# Patient Record
Sex: MALE | Race: WHITE | HISPANIC OR LATINO | URBAN - METROPOLITAN AREA
[De-identification: names, ages, dates, MRNs, and addresses within clinical notes are randomized per-mention and may not be internally consistent; named-entity substitution may affect disease eponyms.]

---

## 2021-11-09 ENCOUNTER — OFFICE (OUTPATIENT)
Dept: URBAN - METROPOLITAN AREA CLINIC 79 | Facility: CLINIC | Age: 66
End: 2021-11-09

## 2021-11-09 ENCOUNTER — OFFICE (OUTPATIENT)
Dept: URBAN - METROPOLITAN AREA TELEHEALTH 12 | Facility: TELEHEALTH | Age: 66
End: 2021-11-09

## 2021-11-09 VITALS — HEIGHT: 65 IN | WEIGHT: 150 LBS

## 2021-11-09 DIAGNOSIS — R10.10 UPPER ABDOMINAL PAIN, UNSPECIFIED: ICD-10-CM

## 2021-11-09 DIAGNOSIS — R14.3 FLATULENCE: ICD-10-CM

## 2021-11-09 DIAGNOSIS — R19.5 OTHER FECAL ABNORMALITIES: ICD-10-CM

## 2021-11-09 DIAGNOSIS — R19.8 OTHER SPECIFIED SYMPTOMS AND SIGNS INVOLVING THE DIGESTIVE S: ICD-10-CM

## 2021-11-09 DIAGNOSIS — R14.1 GAS PAIN: ICD-10-CM

## 2021-11-09 DIAGNOSIS — K21.9 GASTRO-ESOPHAGEAL REFLUX DISEASE WITHOUT ESOPHAGITIS: ICD-10-CM

## 2021-11-09 PROCEDURE — 00031: CPT | Performed by: INTERNAL MEDICINE

## 2021-11-09 PROCEDURE — 99204 OFFICE O/P NEW MOD 45 MIN: CPT | Performed by: INTERNAL MEDICINE

## 2021-11-09 NOTE — SERVICEHPINOTES
PATIENT VERIFIED BY DATE OF BIRTH AND NAME. Patient has been consented for this telecommunication visit.   MANJULA POLLOCK   is a   65   year old male who is being seen in consultation at the request of   SHALOM VILLANUEVA   for postprandial upper abdominal pain and bloating which can last all day and night. He does have a history of reflux for which he is on PPI daily. He denies any dysphagia. His last EGD was in 2013 and was unremarkable. He also notes constipation and can go days without a BM. However on further questioning he states sometimes he can have 3 BMs a week or 3 BMs a day. Recently he has noted thinner stools as well. About 5 weeks ago he felt so distended he had N/V/D and a fever. However he denies any nausea or vomiting at this time. No melena. No weight loss. Last colonoscopy was in 2013 and was unremarkable.

## 2021-11-11 ENCOUNTER — AMBULATORY SURGICAL CENTER (OUTPATIENT)
Dept: URBAN - METROPOLITAN AREA SURGERY 23 | Facility: SURGERY | Age: 66
End: 2021-11-11

## 2021-11-11 DIAGNOSIS — R10.13 EPIGASTRIC PAIN: ICD-10-CM

## 2021-11-11 DIAGNOSIS — K29.60 OTHER GASTRITIS WITHOUT BLEEDING: ICD-10-CM

## 2021-11-11 PROCEDURE — 43239 EGD BIOPSY SINGLE/MULTIPLE: CPT | Performed by: INTERNAL MEDICINE

## 2021-11-16 ENCOUNTER — ON CAMPUS - OUTPATIENT (OUTPATIENT)
Dept: URBAN - METROPOLITAN AREA HOSPITAL 37 | Facility: HOSPITAL | Age: 66
End: 2021-11-16

## 2021-11-16 DIAGNOSIS — K63.5 POLYP OF COLON: ICD-10-CM

## 2021-11-16 DIAGNOSIS — R19.5 OTHER FECAL ABNORMALITIES: ICD-10-CM

## 2021-11-16 DIAGNOSIS — D12.3 BENIGN NEOPLASM OF TRANSVERSE COLON: ICD-10-CM

## 2021-11-16 DIAGNOSIS — R19.4 CHANGE IN BOWEL HABIT: ICD-10-CM

## 2021-11-16 PROCEDURE — 45380 COLONOSCOPY AND BIOPSY: CPT | Performed by: INTERNAL MEDICINE
